# Patient Record
Sex: MALE | Race: WHITE | NOT HISPANIC OR LATINO | Employment: FULL TIME | URBAN - NONMETROPOLITAN AREA
[De-identification: names, ages, dates, MRNs, and addresses within clinical notes are randomized per-mention and may not be internally consistent; named-entity substitution may affect disease eponyms.]

---

## 2023-08-22 ENCOUNTER — OFFICE VISIT (OUTPATIENT)
Dept: PRIMARY CARE | Facility: CLINIC | Age: 33
End: 2023-08-22
Payer: COMMERCIAL

## 2023-08-22 VITALS
DIASTOLIC BLOOD PRESSURE: 78 MMHG | SYSTOLIC BLOOD PRESSURE: 110 MMHG | HEART RATE: 84 BPM | OXYGEN SATURATION: 99 % | HEIGHT: 72 IN | WEIGHT: 209 LBS | BODY MASS INDEX: 28.31 KG/M2

## 2023-08-22 DIAGNOSIS — R53.83 OTHER FATIGUE: Primary | ICD-10-CM

## 2023-08-22 DIAGNOSIS — F34.1 PERSISTENT DEPRESSIVE DISORDER: ICD-10-CM

## 2023-08-22 PROBLEM — K58.0 IRRITABLE BOWEL SYNDROME WITH DIARRHEA: Status: ACTIVE | Noted: 2023-08-22

## 2023-08-22 PROCEDURE — 99213 OFFICE O/P EST LOW 20 MIN: CPT | Performed by: INTERNAL MEDICINE

## 2023-08-22 PROCEDURE — 1036F TOBACCO NON-USER: CPT | Performed by: INTERNAL MEDICINE

## 2023-08-22 RX ORDER — ESCITALOPRAM OXALATE 10 MG/1
10 TABLET ORAL DAILY
Qty: 30 TABLET | Refills: 2 | Status: SHIPPED | OUTPATIENT
Start: 2023-08-22 | End: 2023-10-05

## 2023-08-22 ASSESSMENT — ENCOUNTER SYMPTOMS
CHEST TIGHTNESS: 0
ABDOMINAL PAIN: 0
BACK PAIN: 0
FATIGUE: 1
WHEEZING: 0
DIARRHEA: 0
VOMITING: 0
COUGH: 0
ARTHRALGIAS: 0
PALPITATIONS: 0
SHORTNESS OF BREATH: 0
BLOOD IN STOOL: 0
UNEXPECTED WEIGHT CHANGE: 0
NAUSEA: 0

## 2023-08-22 ASSESSMENT — PATIENT HEALTH QUESTIONNAIRE - PHQ9
1. LITTLE INTEREST OR PLEASURE IN DOING THINGS: SEVERAL DAYS
SUM OF ALL RESPONSES TO PHQ9 QUESTIONS 1 AND 2: 2
2. FEELING DOWN, DEPRESSED OR HOPELESS: SEVERAL DAYS

## 2023-08-22 NOTE — PROGRESS NOTES
Subjective   Patient ID: Braxton Villagran is a 33 y.o. male who presents for No chief complaint on file..  HPI  He is here today to get established.  He states that recently he has noticed that he is not able to focus very well and is inattentive.  He explains that his wife urged him to come in for a checkup and that she thinks he is showing signs of depression.  He states that he works at [employer] and his shifts are for Saturday, Sunday, and Mondays.  He works basically from 6 AM to 9 PM and sometimes it is difficult for him to focus while on the job.  He states during that stent of work he really does not sleep that much because of his schedule.  He ends up drinking large quantities of energy drinks and then he ends up sleeping for a long time when work is over.  He states he does get together with friends especially on Thursdays when he goes to the [employer].  We did conduct a full review of systems and I have reviewed his past medical history.  He has been extremely healthy with the exception of a bout of acute appendicitis 2 summers ago.  He also explains that when he was in high school he did become severely depressed and even tried to commit suicide.  He states he was hospitalized briefly and then he got better.  He does not remember taking medication at that time.  We discussed his concerns.  He denies feeling suicidal at this time.  He states he is wondering if maybe he needs the help of a stimulant.  He states he has some friends that are on medicines like Ritalin.  I explained that I did not feel that that was something we needed to do but to maybe look at his schedule and try to get him on a healthier lifestyle schedule of eating healthy food, exercising, and getting adequate amount of rest.  We even talked about his current work schedule and how it might be disruptive to his family life.  I told him just to think about these things.  We also both agreed that he might benefit from starting an  antidepressant so I am going to put him on escitalopram.  I will also make it clear that if he feels like his condition is worsening over the next several weeks to get a hold of me right away.  We will also be doing some lab work to assess his overall health.  We will see him back in follow-up in 3 weeks.  Review of Systems   Constitutional:  Positive for fatigue. Negative for unexpected weight change.   Respiratory:  Negative for cough, chest tightness, shortness of breath and wheezing.    Cardiovascular:  Negative for chest pain, palpitations and leg swelling.   Gastrointestinal:  Negative for abdominal pain, blood in stool, diarrhea, nausea and vomiting.   Musculoskeletal:  Negative for arthralgias and back pain.     Objective   Physical Exam  Vitals and nursing note reviewed.   Constitutional:       General: He is not in acute distress.     Appearance: Normal appearance.   HENT:      Head: Normocephalic and atraumatic.      Right Ear: Tympanic membrane normal.      Left Ear: Tympanic membrane normal.      Nose: Nose normal.      Mouth/Throat:      Mouth: Mucous membranes are moist.   Eyes:      Conjunctiva/sclera: Conjunctivae normal.   Neck:      Vascular: No carotid bruit.   Cardiovascular:      Rate and Rhythm: Normal rate and regular rhythm.      Heart sounds: Normal heart sounds. No murmur heard.  Pulmonary:      Effort: No respiratory distress.      Breath sounds: No wheezing.   Abdominal:      Palpations: Abdomen is soft.      Tenderness: There is no abdominal tenderness. There is no guarding.   Musculoskeletal:         General: No swelling. Normal range of motion.      Cervical back: No rigidity or tenderness.   Lymphadenopathy:      Cervical: No cervical adenopathy.   Skin:     General: Skin is warm and dry.   Neurological:      General: No focal deficit present.      Mental Status: He is alert and oriented to person, place, and time.   Psychiatric:         Behavior: Behavior normal.        Assessment/Plan   Problem List Items Addressed This Visit       Other fatigue - Primary     -I will be ordering a CBC, CMP, and thyroid blood test  -We will see him back to go the results         Relevant Orders    Comprehensive Metabolic Panel    CBC and Auto Differential    TSH    Persistent depressive disorder     -He will take the escitalopram as directed and I would like to see him back in 3 weeks         Relevant Medications    escitalopram (Lexapro) 10 mg tablet          Sravani Russo DO

## 2023-08-22 NOTE — PATIENT INSTRUCTIONS
As we discussed I have ordered lab work so that we can make sure that you are healthy.  The lab work-up ordered should be done when you are fasting and usually 8 to 10 hours of fasting is sufficient.  Please try to go to the lab at your earliest convenience and we will go over the results when you come back  We also have a patient portal called my chart and you could sign up for that with a password.  The nice thing is that you can actually see your labs immediately when they are completed through [employer].  You can also message me and see my notes  I sent a prescription to your pharmacy for medication called escitalopram to be taken once daily every morning.  If it makes you tired then take it at bedtime please remember to take it every single day.  This medication does take time to take effect and typically people do not see a significant effect until about the third week.  Some people see an effect right away.  I would like to see you back in 3 weeks because I think that would be a good time to see how you are doing on the medication.  If it anytime he felt like this medication was making you feel worse please call us.  We will see her back in 3 weeks

## 2023-09-07 ENCOUNTER — LAB (OUTPATIENT)
Dept: LAB | Facility: LAB | Age: 33
End: 2023-09-07
Payer: COMMERCIAL

## 2023-09-07 DIAGNOSIS — R53.83 OTHER FATIGUE: ICD-10-CM

## 2023-09-07 LAB
ALANINE AMINOTRANSFERASE (SGPT) (U/L) IN SER/PLAS: 30 U/L (ref 10–52)
ALBUMIN (G/DL) IN SER/PLAS: 4.7 G/DL (ref 3.4–5)
ALKALINE PHOSPHATASE (U/L) IN SER/PLAS: 76 U/L (ref 33–120)
ANION GAP IN SER/PLAS: 13 MMOL/L (ref 10–20)
ASPARTATE AMINOTRANSFERASE (SGOT) (U/L) IN SER/PLAS: 25 U/L (ref 9–39)
BASOPHILS (10*3/UL) IN BLOOD BY AUTOMATED COUNT: 0.03 X10E9/L (ref 0–0.1)
BASOPHILS/100 LEUKOCYTES IN BLOOD BY AUTOMATED COUNT: 0.5 % (ref 0–2)
BILIRUBIN TOTAL (MG/DL) IN SER/PLAS: 0.4 MG/DL (ref 0–1.2)
CALCIUM (MG/DL) IN SER/PLAS: 9.6 MG/DL (ref 8.6–10.3)
CARBON DIOXIDE, TOTAL (MMOL/L) IN SER/PLAS: 24 MMOL/L (ref 21–32)
CHLORIDE (MMOL/L) IN SER/PLAS: 107 MMOL/L (ref 98–107)
CREATININE (MG/DL) IN SER/PLAS: 1.06 MG/DL (ref 0.5–1.3)
EOSINOPHILS (10*3/UL) IN BLOOD BY AUTOMATED COUNT: 0.28 X10E9/L (ref 0–0.7)
EOSINOPHILS/100 LEUKOCYTES IN BLOOD BY AUTOMATED COUNT: 4.3 % (ref 0–6)
ERYTHROCYTE DISTRIBUTION WIDTH (RATIO) BY AUTOMATED COUNT: 12.2 % (ref 11.5–14.5)
ERYTHROCYTE MEAN CORPUSCULAR HEMOGLOBIN CONCENTRATION (G/DL) BY AUTOMATED: 33.2 G/DL (ref 32–36)
ERYTHROCYTE MEAN CORPUSCULAR VOLUME (FL) BY AUTOMATED COUNT: 90 FL (ref 80–100)
ERYTHROCYTES (10*6/UL) IN BLOOD BY AUTOMATED COUNT: 5.05 X10E12/L (ref 4.5–5.9)
GFR MALE: >90 ML/MIN/1.73M2
GLUCOSE (MG/DL) IN SER/PLAS: 81 MG/DL (ref 74–99)
HEMATOCRIT (%) IN BLOOD BY AUTOMATED COUNT: 45.5 % (ref 41–52)
HEMOGLOBIN (G/DL) IN BLOOD: 15.1 G/DL (ref 13.5–17.5)
IMMATURE GRANULOCYTES/100 LEUKOCYTES IN BLOOD BY AUTOMATED COUNT: 0.3 % (ref 0–0.9)
LEUKOCYTES (10*3/UL) IN BLOOD BY AUTOMATED COUNT: 6.5 X10E9/L (ref 4.4–11.3)
LYMPHOCYTES (10*3/UL) IN BLOOD BY AUTOMATED COUNT: 1.58 X10E9/L (ref 1.2–4.8)
LYMPHOCYTES/100 LEUKOCYTES IN BLOOD BY AUTOMATED COUNT: 24.2 % (ref 13–44)
MONOCYTES (10*3/UL) IN BLOOD BY AUTOMATED COUNT: 0.48 X10E9/L (ref 0.1–1)
MONOCYTES/100 LEUKOCYTES IN BLOOD BY AUTOMATED COUNT: 7.3 % (ref 2–10)
NEUTROPHILS (10*3/UL) IN BLOOD BY AUTOMATED COUNT: 4.15 X10E9/L (ref 1.2–7.7)
NEUTROPHILS/100 LEUKOCYTES IN BLOOD BY AUTOMATED COUNT: 63.4 % (ref 40–80)
PLATELETS (10*3/UL) IN BLOOD AUTOMATED COUNT: 287 X10E9/L (ref 150–450)
POTASSIUM (MMOL/L) IN SER/PLAS: 4.6 MMOL/L (ref 3.5–5.3)
PROTEIN TOTAL: 7 G/DL (ref 6.4–8.2)
SODIUM (MMOL/L) IN SER/PLAS: 139 MMOL/L (ref 136–145)
THYROTROPIN (MIU/L) IN SER/PLAS BY DETECTION LIMIT <= 0.05 MIU/L: 1.73 MIU/L (ref 0.44–3.98)
UREA NITROGEN (MG/DL) IN SER/PLAS: 15 MG/DL (ref 6–23)

## 2023-09-07 PROCEDURE — 84443 ASSAY THYROID STIM HORMONE: CPT

## 2023-09-07 PROCEDURE — 36415 COLL VENOUS BLD VENIPUNCTURE: CPT

## 2023-09-07 PROCEDURE — 80053 COMPREHEN METABOLIC PANEL: CPT

## 2023-09-07 PROCEDURE — 85025 COMPLETE CBC W/AUTO DIFF WBC: CPT

## 2023-09-12 ENCOUNTER — OFFICE VISIT (OUTPATIENT)
Dept: PRIMARY CARE | Facility: CLINIC | Age: 33
End: 2023-09-12
Payer: COMMERCIAL

## 2023-09-12 VITALS
BODY MASS INDEX: 28.31 KG/M2 | WEIGHT: 209 LBS | DIASTOLIC BLOOD PRESSURE: 74 MMHG | OXYGEN SATURATION: 99 % | HEIGHT: 72 IN | SYSTOLIC BLOOD PRESSURE: 116 MMHG | HEART RATE: 91 BPM

## 2023-09-12 DIAGNOSIS — R53.83 OTHER FATIGUE: ICD-10-CM

## 2023-09-12 DIAGNOSIS — F34.1 PERSISTENT DEPRESSIVE DISORDER: Primary | ICD-10-CM

## 2023-09-12 PROCEDURE — 99213 OFFICE O/P EST LOW 20 MIN: CPT | Performed by: INTERNAL MEDICINE

## 2023-09-12 PROCEDURE — 1036F TOBACCO NON-USER: CPT | Performed by: INTERNAL MEDICINE

## 2023-09-12 RX ORDER — ARIPIPRAZOLE 2 MG/1
2 TABLET ORAL DAILY
Qty: 30 TABLET | Refills: 1 | Status: SHIPPED | OUTPATIENT
Start: 2023-09-12 | End: 2023-10-12

## 2023-09-12 NOTE — ASSESSMENT & PLAN NOTE
-He will continue with escitalopram 10 mg daily and we are adding Abilify 2 mg daily  -See him back in approximately 3 to 4 weeks  -We are referring him to a counselor

## 2023-09-12 NOTE — PATIENT INSTRUCTIONS
As we discussed I would asked that you stay on the escitalopram 10 mg once daily  I sent a second prescription to your pharmacy for a medicine called Abilify to be taken once daily at the very same time you take the escitalopram.  Please call if you are not doing well with this medication and I would like to see you back again in 3 to 4 weeks  The staff will also be helping you to get an appointment with counseling

## 2023-09-12 NOTE — PROGRESS NOTES
Subjective   Patient ID: Braxton Villagran is a 33 y.o. male who presents for No chief complaint on file..  HPI  He is here today for his 3-week follow-up.  He has been taking the escitalopram as directed but he states he is really feeling no different.  Is not necessarily feeling worse but also not feeling significantly better.  He is tolerating the medication well.  We did go over the results of his lab work and everything came back completely normal.  I do believe his fatigue is stemming from his depression.  We talked about various ways that we could change his current medication and decided that we would add Abilify to his escitalopram.  We will see him back in a few weeks and he knows to call sooner if things or not going well.  He also was agreeable to going to a therapist and we will help facilitate a referral today.  Objective   Physical Exam  Vitals and nursing note reviewed.   Constitutional:       General: He is not in acute distress.     Appearance: Normal appearance.   HENT:      Head: Normocephalic and atraumatic.   Eyes:      Conjunctiva/sclera: Conjunctivae normal.   Cardiovascular:      Rate and Rhythm: Normal rate and regular rhythm.      Heart sounds: Normal heart sounds.   Pulmonary:      Effort: No respiratory distress.      Breath sounds: No wheezing.   Abdominal:      Palpations: Abdomen is soft.      Tenderness: There is no abdominal tenderness. There is no guarding.   Musculoskeletal:         General: No swelling. Normal range of motion.   Skin:     General: Skin is warm and dry.   Neurological:      General: No focal deficit present.      Mental Status: He is alert and oriented to person, place, and time.   Psychiatric:         Behavior: Behavior normal.       Recent Results (from the past 672 hour(s))   Comprehensive Metabolic Panel    Collection Time: 09/07/23  9:36 AM   Result Value Ref Range    Glucose 81 74 - 99 mg/dL    Sodium 139 136 - 145 mmol/L    Potassium 4.6 3.5 - 5.3 mmol/L     Chloride 107 98 - 107 mmol/L    Bicarbonate 24 21 - 32 mmol/L    Anion Gap 13 10 - 20 mmol/L    Urea Nitrogen 15 6 - 23 mg/dL    Creatinine 1.06 0.50 - 1.30 mg/dL    GFR MALE >90 >90 mL/min/1.73m2    Calcium 9.6 8.6 - 10.3 mg/dL    Albumin 4.7 3.4 - 5.0 g/dL    Alkaline Phosphatase 76 33 - 120 U/L    Total Protein 7.0 6.4 - 8.2 g/dL    AST 25 9 - 39 U/L    Total Bilirubin 0.4 0.0 - 1.2 mg/dL    ALT (SGPT) 30 10 - 52 U/L   CBC and Auto Differential    Collection Time: 09/07/23  9:36 AM   Result Value Ref Range    WBC 6.5 4.4 - 11.3 x10E9/L    RBC 5.05 4.50 - 5.90 x10E12/L    Hemoglobin 15.1 13.5 - 17.5 g/dL    Hematocrit 45.5 41.0 - 52.0 %    MCV 90 80 - 100 fL    MCHC 33.2 32.0 - 36.0 g/dL    Platelets 287 150 - 450 x10E9/L    RDW 12.2 11.5 - 14.5 %    Neutrophils % 63.4 40.0 - 80.0 %    Immature Granulocytes %, Automated 0.3 0.0 - 0.9 %    Lymphocytes % 24.2 13.0 - 44.0 %    Monocytes % 7.3 2.0 - 10.0 %    Eosinophils % 4.3 0.0 - 6.0 %    Basophils % 0.5 0.0 - 2.0 %    Neutrophils Absolute 4.15 1.20 - 7.70 x10E9/L    Lymphocytes Absolute 1.58 1.20 - 4.80 x10E9/L    Monocytes Absolute 0.48 0.10 - 1.00 x10E9/L    Eosinophils Absolute 0.28 0.00 - 0.70 x10E9/L    Basophils Absolute 0.03 0.00 - 0.10 x10E9/L   TSH    Collection Time: 09/07/23  9:36 AM   Result Value Ref Range    TSH 1.73 0.44 - 3.98 mIU/L       Assessment/Plan   Problem List Items Addressed This Visit       Other fatigue     -Recent comprehensive lab work is all within normal range         Persistent depressive disorder - Primary     -He will continue with escitalopram 10 mg daily and we are adding Abilify 2 mg daily  -See him back in approximately 3 to 4 weeks  -We are referring him to a counselor         Relevant Medications    ARIPiprazole (Abilify) 2 mg tablet    Other Relevant Orders    Referral to Psychology          Sravani Russo DO

## 2023-10-05 ENCOUNTER — LAB (OUTPATIENT)
Dept: LAB | Facility: LAB | Age: 33
End: 2023-10-05
Payer: COMMERCIAL

## 2023-10-05 ENCOUNTER — OFFICE VISIT (OUTPATIENT)
Dept: PRIMARY CARE | Facility: CLINIC | Age: 33
End: 2023-10-05
Payer: COMMERCIAL

## 2023-10-05 VITALS
BODY MASS INDEX: 28.71 KG/M2 | HEIGHT: 72 IN | HEART RATE: 66 BPM | SYSTOLIC BLOOD PRESSURE: 124 MMHG | OXYGEN SATURATION: 98 % | WEIGHT: 212 LBS | DIASTOLIC BLOOD PRESSURE: 76 MMHG

## 2023-10-05 DIAGNOSIS — E55.9 VITAMIN D DEFICIENCY: ICD-10-CM

## 2023-10-05 DIAGNOSIS — F34.1 PERSISTENT DEPRESSIVE DISORDER: ICD-10-CM

## 2023-10-05 DIAGNOSIS — E55.9 VITAMIN D DEFICIENCY: Primary | ICD-10-CM

## 2023-10-05 LAB — 25(OH)D3 SERPL-MCNC: 11 NG/ML (ref 30–100)

## 2023-10-05 PROCEDURE — 99213 OFFICE O/P EST LOW 20 MIN: CPT | Performed by: INTERNAL MEDICINE

## 2023-10-05 PROCEDURE — 82306 VITAMIN D 25 HYDROXY: CPT

## 2023-10-05 PROCEDURE — 1036F TOBACCO NON-USER: CPT | Performed by: INTERNAL MEDICINE

## 2023-10-05 PROCEDURE — 36415 COLL VENOUS BLD VENIPUNCTURE: CPT

## 2023-10-05 RX ORDER — DESVENLAFAXINE 50 MG/1
50 TABLET, EXTENDED RELEASE ORAL DAILY
Qty: 30 TABLET | Refills: 11 | Status: SHIPPED | OUTPATIENT
Start: 2023-10-05 | End: 2024-10-04

## 2023-10-05 NOTE — ASSESSMENT & PLAN NOTE
-We tried escitalopram 10 mg first for several weeks and he had a flat response so we added Abilify 2 mg daily but again not a significant response  -He has stopped the escitalopram  -He will continue with Abilify  -I am adding Pristiq 50 mg daily  -I am referring him to psychology and psychiatry and he will call if he is not doing well

## 2023-10-05 NOTE — ASSESSMENT & PLAN NOTE
-He has a fraternal twin who suffers from low vitamin D and we will have him get a vitamin D level today before leaving.  I have agreed to contact him with results

## 2023-10-05 NOTE — PATIENT INSTRUCTIONS
As we discussed I would like for you to stay on your Abilify and I am adding a different medication called Pristiq.  You will take both of these tablets together every morning and please call if you are getting worse as opposed to getting better  I am also referring you to psychiatry and for some reason you are able to get in before our next visit then you are certainly welcome to cancel the visit with me.  Otherwise we will see you back  I am having you stop on the way out today to get a vitamin D level and I will call you with the results when it comes back

## 2023-10-05 NOTE — PROGRESS NOTES
Subjective   Patient ID: Braxton Villagran is a 33 y.o. male who presents for No chief complaint on file..  HPI  He is here today for follow-up.  We saw him approximately 3 or more weeks ago and determined that he was not responding significantly to his escitalopram so we decided to add Abilify to his regimen.  He states he took the Abilify with the escitalopram for about 3 weeks and again did not notice a big difference in his mood.  He since then has stopped taking the escitalopram.  He states he does not really feel worse and he certainly does not feel suicidal.  Also we had intended to refer him to psychology but he states nobody called him to make the appointment.  I told him I definitely would like for him to be seen because someone to make sure were doing the best job for him and selecting medication.  He understands it can take several weeks to get in so I have decided to try Pristiq in the lieu of his escitalopram until he can get in.  He knows to call if he feels like his condition is worsening as opposed to getting better.  He also states he has a fraternal twin has been advised that he is suffering from low vitamin D levels.  We reviewed his lab work from last month and everything looked excellent but I did not check a vitamin D.  I will have him get that done today before leaving and I have agreed to contact him with results.  Objective   Physical Exam  Vitals and nursing note reviewed.   Constitutional:       General: He is not in acute distress.     Appearance: Normal appearance.   HENT:      Head: Normocephalic and atraumatic.   Eyes:      Conjunctiva/sclera: Conjunctivae normal.   Cardiovascular:      Rate and Rhythm: Normal rate and regular rhythm.      Heart sounds: Normal heart sounds.   Pulmonary:      Effort: No respiratory distress.      Breath sounds: No wheezing.   Abdominal:      Palpations: Abdomen is soft.      Tenderness: There is no abdominal tenderness. There is no guarding.    Musculoskeletal:         General: No swelling. Normal range of motion.   Skin:     General: Skin is warm and dry.   Neurological:      General: No focal deficit present.      Mental Status: He is alert and oriented to person, place, and time.   Psychiatric:         Behavior: Behavior normal.      Comments: Flat affect       Assessment/Plan   Problem List Items Addressed This Visit             ICD-10-CM    Persistent depressive disorder F34.1     -We tried escitalopram 10 mg first for several weeks and he had a flat response so we added Abilify 2 mg daily but again not a significant response  -He has stopped the escitalopram  -He will continue with Abilify  -I am adding Pristiq 50 mg daily  -I am referring him to psychology and psychiatry and he will call if he is not doing well         Relevant Medications    desvenlafaxine (Pristiq) 50 mg 24 hr tablet    Other Relevant Orders    Referral to Psychiatry    Vitamin D deficiency - Primary E55.9     -He has a fraternal twin who suffers from low vitamin D and we will have him get a vitamin D level today before leaving.  I have agreed to contact him with results         Relevant Orders    Vitamin D 25-Hydroxy,Total (for eval of Vitamin D levels)          Sravani Russo,

## 2025-01-23 NOTE — PROGRESS NOTES
Subjective   Patient ID: Braxton Villagran is a 34 y.o. male    HPI  34 y.o. male who presented for vasectomy evaluation, the patient is  with no children and notes he is 100 percent sure he does not want children in the future. I had a long and extensive discussion with the patient regarding vasectomy. I informed him that he should consider this procedure as permanent and he should be 100% sure about proceeding with it. I explained to him in detail the steps of the vasectomy, I also had a long discussion with him regarding the possible side effects including infection, hematoma, bleeding, chronic pain, testicular congestion, injury to surrounding structure. We also discussed the multiple studies linking vasectomy to prostate cancer I explained to him the correlation between this procedure and prostate cancer. The patient verbalized understanding of all the risks and benefits and would like to proceed. I explained to him that there is a small chance of spontaneous return of the semen because of reconnection of his vas ending. I also told him that he is not close to the sterile unless it is proven by a post vasectomy semen analysis after around 3 months. I also explained to him that some man with a much longer to clear his semen from there, this most could take up to 6 months during which he would be still able to proceed.     On physical exam today a mole was noted.     Review of Systems    All systems were reviewed. Anything negative was noted in the HPI.    Objective   Physical Exam  Genitourinary:     Pubic Area: No rash.       Penis: Normal and circumcised. No hypospadias.       Testes:         Right: Mass, tenderness, swelling, testicular hydrocele or varicocele not present. Right testis is descended.         Left: Mass, tenderness, swelling, testicular hydrocele or varicocele not present. Left testis is descended.      Epididymis:      Right: Not inflamed or enlarged. No mass or tenderness.      Left: Not  inflamed or enlarged. No mass or tenderness.         General: Well developed, well nourished, alert and cooperative, appears in no acute distress   Eyes: Non-injected conjunctiva, sclera clear, no proptosis   Cardiac: Extremities are warm and well perfused. No edema, cyanosis or pallor   Lungs: Breathing is easy, non-labored. Speaking in clear and complete sentences. Normal diaphragmatic movement   MSK: Ambulatory with steady gait, unassisted   Neuro: Alert and oriented to person, place, and time   Psych: Demonstrates good judgment and reason, without hallucinations, abnormal affect or abnormal behaviors   Skin: No obvious lesions, no rashes       No CVA tenderness bilaterally   No suprapubic pain or discomfort       No past medical history on file.      Past Surgical History:   Procedure Laterality Date    OTHER SURGICAL HISTORY  11/18/2021    Appendectomy    OTHER SURGICAL HISTORY  11/18/2021    Hernia repair    OTHER SURGICAL HISTORY  11/18/2021    [place] tooth extraction           Assessment/Plan   Vasectomy Evaluation, 2 large abnormal moles in his SP area need to check them out and rule out melanoma     34 y.o. male who presents for the above condition, Vasectomy Evaluation     Patient presents today for evaluation of vasectomy. We had an extensive discussion about the procedure and post-procedure protocol. We discussed that vasectomy is intended to be a permanent form of contraception. There are options for fertility post vasectomy, including vasectomy reversal and sperm retrieval but these are not always successful and can be rather expensive.  We highlighted that it is not an immediate form of contraception, and that another form is required until vas occlusion is confirmed with a post-vasectomy semen analysis. We recommend that the patient refrain from ejaculation for 1 week post-procedure and we would be checking a post-vasectomy semen analysis in 2-3 months, or 15-20 ejaculates. Need for repeat vasectomy  is very low, <1%. There is a very small risk for pregnancy after vasectomy (1 in 2,000). We define a successful vasectomy by achieving azoospermia or less than 100,000 non-motile sperm on post-vasectomy semen analysis.  We discussed that the procedure would be done in the office under local anesthesia. Complications were discussed including but not limited to pain, which can be chronic in nature, bleeding/hematoma formation, and infection. We recommended ice and rest for the next 48-72hrs. Patient may be prescribed an anti-inflammatory for pain control. Patient is instructed to refrain from strenuous activity for 2 weeks.  No barriers to learning were identified. After all of the patient’s questions were satisfactorily answered, he expressed understanding of the risks of surgery and wishes to proceed with vasectomy.        Plan:  - Schedule patient for vasectomy   - Referred to Dermatology for mole    E&M visit today is associated with current or anticipated ongoing medical care services related to a patient's single, serious condition or a complex condition.     1/27/2025    Scribe Attestation  By signing my name below, Jesse CAI Scribe attest that this documentation has been prepared under the direction and in the presence of Dr. Elias Caceres.

## 2025-01-27 ENCOUNTER — APPOINTMENT (OUTPATIENT)
Dept: UROLOGY | Facility: CLINIC | Age: 35
End: 2025-01-27
Payer: COMMERCIAL

## 2025-01-27 VITALS — BODY MASS INDEX: 29.53 KG/M2 | HEIGHT: 72 IN | WEIGHT: 218 LBS

## 2025-01-27 DIAGNOSIS — Z30.09 VASECTOMY EVALUATION: Primary | ICD-10-CM

## 2025-01-27 DIAGNOSIS — D22.9 SKIN MOLE: Primary | ICD-10-CM

## 2025-01-27 PROCEDURE — 3008F BODY MASS INDEX DOCD: CPT | Performed by: STUDENT IN AN ORGANIZED HEALTH CARE EDUCATION/TRAINING PROGRAM

## 2025-01-27 PROCEDURE — 1036F TOBACCO NON-USER: CPT | Performed by: STUDENT IN AN ORGANIZED HEALTH CARE EDUCATION/TRAINING PROGRAM

## 2025-01-27 PROCEDURE — 99204 OFFICE O/P NEW MOD 45 MIN: CPT | Performed by: STUDENT IN AN ORGANIZED HEALTH CARE EDUCATION/TRAINING PROGRAM

## 2025-02-11 NOTE — PROGRESS NOTES
Patient ID: Braxton Villagran is a 34 y.o. male.    Vasectomy    Date/Time: 2/24/2025 12:50 PM    Performed by: Elias Caceres MD MPH  Authorized by: Elias Caceres MD MPH      Procedure discussed: discussed risks, benefits, and alternatives    Chaperone present: yes    Timeout: timeout called immediately prior to procedure    Prep: patient was prepped and draped in usual sterile fashion    Prep type: Betadine    Anesthesia: local anesthesia used    Local anesthetic: lidocaine without epinephrine    Procedure Details:     Indications: desire for sterilization      Laterality: bilateral      Incisions: 2      Right Vas Deferens:      Divided: yes        Hemoclips applied: yes        Cauterized: yes         Left Vas Deferens:      Divided: yes        Hemoclips applied: yes        Cauterized: yes          Skin closure: suture     Closure suture type: 3-0 chromic gut    Post-Procedure Details:     Pathology sent to lab for analysis: yes      Outcome: patient tolerated procedure well with no complications      Post-procedure interventions: sterile dressing applied and wound care instructions given      Dressing type: antibiotic ointment    Disposition: discharged home in satisfactory condition          The patient was prepped and draped in the standard surgical fashion. 1% Lidocaine was injected into the scrotum. A small scrotal excision was made and the vas deferens brought through the incision. We then dissected the vas deferens free of its surroundings attachments and three clips were placed on the vas deferens. A section of the vas deferens was then excised. We then assured that adequate hemostasis was obtained. I closed the excision with a single chromic suture. The identical procedure was performed on the opposite side. The patient tolerated the procedure well and there were no complications. The patient was instructed on post-operative care as well as the importance of dropping off a semen analysis. The  post-operative instructions were given to the patient in writing as well.        FOLLOW UP 12 weeks with post VAS SA

## 2025-02-24 ENCOUNTER — APPOINTMENT (OUTPATIENT)
Dept: UROLOGY | Facility: CLINIC | Age: 35
End: 2025-02-24
Payer: COMMERCIAL

## 2025-02-24 DIAGNOSIS — Z30.2 ADMISSION FOR VASECTOMY: Primary | ICD-10-CM

## 2025-02-24 PROCEDURE — 55250 REMOVAL OF SPERM DUCT(S): CPT | Performed by: STUDENT IN AN ORGANIZED HEALTH CARE EDUCATION/TRAINING PROGRAM

## 2025-02-24 ASSESSMENT — PATIENT HEALTH QUESTIONNAIRE - PHQ9
1. LITTLE INTEREST OR PLEASURE IN DOING THINGS: NOT AT ALL
2. FEELING DOWN, DEPRESSED OR HOPELESS: NOT AT ALL
SUM OF ALL RESPONSES TO PHQ9 QUESTIONS 1 AND 2: 0